# Patient Record
Sex: MALE | Race: WHITE | NOT HISPANIC OR LATINO
[De-identification: names, ages, dates, MRNs, and addresses within clinical notes are randomized per-mention and may not be internally consistent; named-entity substitution may affect disease eponyms.]

---

## 2019-07-30 PROBLEM — Z00.00 ENCOUNTER FOR PREVENTIVE HEALTH EXAMINATION: Status: ACTIVE | Noted: 2019-07-30

## 2019-08-07 ENCOUNTER — APPOINTMENT (OUTPATIENT)
Dept: VASCULAR SURGERY | Facility: CLINIC | Age: 69
End: 2019-08-07
Payer: MEDICARE

## 2019-08-07 VITALS
HEART RATE: 54 BPM | DIASTOLIC BLOOD PRESSURE: 74 MMHG | BODY MASS INDEX: 24.35 KG/M2 | SYSTOLIC BLOOD PRESSURE: 135 MMHG | WEIGHT: 124 LBS | OXYGEN SATURATION: 98 % | HEIGHT: 60 IN

## 2019-08-07 DIAGNOSIS — I25.10 ATHEROSCLEROTIC HEART DISEASE OF NATIVE CORONARY ARTERY W/OUT ANGINA PECTORIS: ICD-10-CM

## 2019-08-07 DIAGNOSIS — I10 ESSENTIAL (PRIMARY) HYPERTENSION: ICD-10-CM

## 2019-08-07 DIAGNOSIS — I73.9 PERIPHERAL VASCULAR DISEASE, UNSPECIFIED: ICD-10-CM

## 2019-08-07 DIAGNOSIS — G57.93 UNSPECIFIED MONONEUROPATHY OF BILATERAL LOWER LIMBS: ICD-10-CM

## 2019-08-07 DIAGNOSIS — C61 MALIGNANT NEOPLASM OF PROSTATE: ICD-10-CM

## 2019-08-07 PROCEDURE — 99204 OFFICE O/P NEW MOD 45 MIN: CPT

## 2019-08-07 PROCEDURE — 93923 UPR/LXTR ART STDY 3+ LVLS: CPT

## 2019-08-07 RX ORDER — ROSUVASTATIN CALCIUM 10 MG/1
10 TABLET, FILM COATED ORAL
Refills: 0 | Status: ACTIVE | COMMUNITY

## 2019-08-07 RX ORDER — ASPIRIN 81 MG
81 TABLET, DELAYED RELEASE (ENTERIC COATED) ORAL
Refills: 0 | Status: ACTIVE | COMMUNITY

## 2019-08-07 RX ORDER — CLOPIDOGREL 75 MG/1
75 TABLET, FILM COATED ORAL
Refills: 0 | Status: ACTIVE | COMMUNITY

## 2019-08-07 RX ORDER — NITROGLYCERIN 0.4 MG/1
0.4 TABLET SUBLINGUAL
Refills: 0 | Status: ACTIVE | COMMUNITY

## 2019-08-07 RX ORDER — METOPROLOL SUCCINATE 100 MG/1
100 TABLET, EXTENDED RELEASE ORAL
Refills: 0 | Status: ACTIVE | COMMUNITY

## 2019-08-07 RX ORDER — ISOSORBIDE MONONITRATE 120 MG/1
120 TABLET, EXTENDED RELEASE ORAL
Refills: 0 | Status: ACTIVE | COMMUNITY

## 2019-08-07 RX ORDER — IRBESARTAN 300 MG/1
300 TABLET ORAL
Refills: 0 | Status: ACTIVE | COMMUNITY

## 2019-08-13 NOTE — CONSULT LETTER
[Dear  ___] : Dear  [unfilled], [FreeTextEntry2] : Jakub Tyson MD\par [address]\[place] [address]\par \par Mark Olivo MD\par [address] [place] [address]\[place] [address]\par \par Rosalino Bermudez MD\par [address] [place] [address]\[place] [address] [FreeTextEntry3] : Sincerely, \par \par Jimmy Zaragoza M.D. \par , [facility]\par , Department of Surgery [facility]\par Professor of Surgery, [employer] at [facility]  [FreeTextEntry1] : Thank you for referring Mr Umberto Hardy for evaluation. He reports burning pain and numbness on the plantar aspect of the feet, limiting his ability to walk. He reports being able to walk no more than 2 blocks before he starts experiencing pain however, he explains his cardiac status also impedes him from walking too far. His symptoms resolve once he sits and rests. Denies any pain on the calves or thighs while walking. Also, he notes the feet turn "red" occasionally. Denies any history of clotting or bleeding disorders, or open sores. He has known spinal discs compressions at several lumbar levels.  Medications include plavix, aspirin, statin and imdur.\par \par On exam, I am unable to palpate distal popliteal or pedal pulses.  Both feet are warm, pink with good capillary refill.  Skin is intact. \par \par Arterial dopplers demonstrate an ARI of 0.80 on the right and 0.61 on the left.  Waveforms are slightly dampened on the left at the level of the ankle and bilaterally at the level of the metatarsals.\par \par I do not suspect Mr Hardy's symptoms are vascular in origin. He has no palpable pulses but feet are well perfused both clinically and on the doppler studies as well.   His symptoms are likely neurological in origin due to spinal disc compressions.  No vascular interventions are needed at this time. He should try to remain as active as possible even if it walking only short distances.  He may follow-up as needed in our office.\par \par My complete EMR office note is below for your records.

## 2019-08-13 NOTE — END OF VISIT
[FreeTextEntry3] : All medical record entries made by the Scribe were at my, Dr. Zaragoza's direction and personally dictated by me on 08/07/2019 I have reviewed the chart and agree that the record accurately reflects my personal performance of the history, physical exam, assessment and plan. I have also personally directed, reviewed, and agreed with the chart.\par

## 2019-08-13 NOTE — PHYSICAL EXAM
[Respiratory Effort] : normal respiratory effort [No Rash or Lesion] : No rash or lesion [Alert] : alert [Calm] : calm [0] : left 0 [1+] : left 1+ [Oriented to Person] : oriented to person [Oriented to Place] : oriented to place [Oriented to Time] : oriented to time [JVD] : no jugular venous distention  [Ankle Swelling (On Exam)] : not present [Varicose Veins Of Lower Extremities] : not present [] : not present [Abdomen Tenderness] : ~T ~M No abdominal tenderness [de-identified] : NCAT [de-identified] : Well appearing, NAD [FreeTextEntry1] : Good capillary refill. Toes are pink and warm.  [de-identified] : FROM

## 2019-08-13 NOTE — HISTORY OF PRESENT ILLNESS
[FreeTextEntry1] : 69 y/o M presents here today for initial evaluation of pain and redness to his feet. He has a strong hx of CAD s/p CABG x3 and multiple stents, HTN, and Exertional Angina. He reports that he can only walk about 1.5-2 blocks before he has to stop due to pain on his chest and bilateral plantar feet pain. He describes the pain on his feet as burning and numb, but resolves with sitting. He states that this began about 5 years ago and has been experiencing the pain intermittently. He also states that he has several compressed spinal discs. Additionally, he experiences pain with standing, sensing discomfort to his lateral and posterior calf, also relieved with sitting. Denies any rest pain, open ulcers or sores.\par \par Patient is retired, used to work as a state investigator. \par Patient is a family member of .

## 2019-08-13 NOTE — ADDENDUM
[FreeTextEntry1] : Documented by Therese Capellan acting as a scribe for Dr. Jimmy Zaragoza on 08/07/2019\par

## 2019-08-13 NOTE — ASSESSMENT
[Arterial/Venous Disease] : arterial/venous disease [FreeTextEntry1] : 67 y/o M presents here today for initial evaluation of pain and redness to his feet. On exam, patient without any palpable pedal pulses but with good capillary refill, toes are pink and warm. ARI/PVR completed today shows R: 0.80 and L:0.61. Given this, I do not suspect his symptoms to be vascular in origin. I informed patient that his symptoms are likely neurological in origin due to his spinal disc compression. No vascular intervention is needed at this time. He should remain as active as possible. Patient will follow up here as needed.